# Patient Record
Sex: FEMALE | Race: OTHER | HISPANIC OR LATINO | URBAN - METROPOLITAN AREA
[De-identification: names, ages, dates, MRNs, and addresses within clinical notes are randomized per-mention and may not be internally consistent; named-entity substitution may affect disease eponyms.]

---

## 2024-11-08 ENCOUNTER — EMERGENCY (EMERGENCY)
Facility: HOSPITAL | Age: 84
LOS: 1 days | Discharge: HOME | End: 2024-11-08
Attending: EMERGENCY MEDICINE | Admitting: EMERGENCY MEDICINE
Payer: MEDICARE

## 2024-11-08 VITALS
DIASTOLIC BLOOD PRESSURE: 82 MMHG | WEIGHT: 175.05 LBS | OXYGEN SATURATION: 95 % | RESPIRATION RATE: 18 BRPM | TEMPERATURE: 98 F | HEIGHT: 62 IN | SYSTOLIC BLOOD PRESSURE: 198 MMHG | HEART RATE: 67 BPM

## 2024-11-08 VITALS
RESPIRATION RATE: 18 BRPM | SYSTOLIC BLOOD PRESSURE: 153 MMHG | HEART RATE: 60 BPM | TEMPERATURE: 98 F | DIASTOLIC BLOOD PRESSURE: 81 MMHG | OXYGEN SATURATION: 100 %

## 2024-11-08 LAB
ANION GAP SERPL CALC-SCNC: 8 MMOL/L — SIGNIFICANT CHANGE UP (ref 5–17)
BASOPHILS # BLD AUTO: 0 K/UL — SIGNIFICANT CHANGE UP (ref 0–0.2)
BASOPHILS NFR BLD AUTO: 0 % — SIGNIFICANT CHANGE UP (ref 0–2)
BUN SERPL-MCNC: 17 MG/DL — SIGNIFICANT CHANGE UP (ref 7–23)
CALCIUM SERPL-MCNC: 10.2 MG/DL — SIGNIFICANT CHANGE UP (ref 8.4–10.5)
CHLORIDE SERPL-SCNC: 107 MMOL/L — SIGNIFICANT CHANGE UP (ref 96–108)
CO2 SERPL-SCNC: 24 MMOL/L — SIGNIFICANT CHANGE UP (ref 22–31)
CREAT SERPL-MCNC: 1.03 MG/DL — SIGNIFICANT CHANGE UP (ref 0.5–1.3)
EGFR: 53 ML/MIN/1.73M2 — LOW
EOSINOPHIL # BLD AUTO: 0.28 K/UL — SIGNIFICANT CHANGE UP (ref 0–0.5)
EOSINOPHIL NFR BLD AUTO: 2.9 % — SIGNIFICANT CHANGE UP (ref 0–6)
GLUCOSE SERPL-MCNC: 106 MG/DL — HIGH (ref 70–99)
HCT VFR BLD CALC: 36.2 % — SIGNIFICANT CHANGE UP (ref 34.5–45)
HGB BLD-MCNC: 11.4 G/DL — LOW (ref 11.5–15.5)
IMM GRANULOCYTES NFR BLD AUTO: 0.5 % — SIGNIFICANT CHANGE UP (ref 0–0.9)
LYMPHOCYTES # BLD AUTO: 0.99 K/UL — LOW (ref 1–3.3)
LYMPHOCYTES # BLD AUTO: 10.3 % — LOW (ref 13–44)
MCHC RBC-ENTMCNC: 26.7 PG — LOW (ref 27–34)
MCHC RBC-ENTMCNC: 31.5 G/DL — LOW (ref 32–36)
MCV RBC AUTO: 84.8 FL — SIGNIFICANT CHANGE UP (ref 80–100)
MONOCYTES # BLD AUTO: 0.92 K/UL — HIGH (ref 0–0.9)
MONOCYTES NFR BLD AUTO: 9.5 % — SIGNIFICANT CHANGE UP (ref 2–14)
NEUTROPHILS # BLD AUTO: 7.4 K/UL — SIGNIFICANT CHANGE UP (ref 1.8–7.4)
NEUTROPHILS NFR BLD AUTO: 76.8 % — SIGNIFICANT CHANGE UP (ref 43–77)
NRBC # BLD: 0 /100 WBCS — SIGNIFICANT CHANGE UP (ref 0–0)
PLATELET # BLD AUTO: 168 K/UL — SIGNIFICANT CHANGE UP (ref 150–400)
POTASSIUM SERPL-MCNC: 4.4 MMOL/L — SIGNIFICANT CHANGE UP (ref 3.5–5.3)
POTASSIUM SERPL-SCNC: 4.4 MMOL/L — SIGNIFICANT CHANGE UP (ref 3.5–5.3)
RBC # BLD: 4.27 M/UL — SIGNIFICANT CHANGE UP (ref 3.8–5.2)
RBC # FLD: 15.8 % — HIGH (ref 10.3–14.5)
SODIUM SERPL-SCNC: 139 MMOL/L — SIGNIFICANT CHANGE UP (ref 135–145)
WBC # BLD: 9.64 K/UL — SIGNIFICANT CHANGE UP (ref 3.8–10.5)
WBC # FLD AUTO: 9.64 K/UL — SIGNIFICANT CHANGE UP (ref 3.8–10.5)

## 2024-11-08 PROCEDURE — 80048 BASIC METABOLIC PNL TOTAL CA: CPT

## 2024-11-08 PROCEDURE — 85025 COMPLETE CBC W/AUTO DIFF WBC: CPT

## 2024-11-08 PROCEDURE — 36415 COLL VENOUS BLD VENIPUNCTURE: CPT

## 2024-11-08 PROCEDURE — 96372 THER/PROPH/DIAG INJ SC/IM: CPT

## 2024-11-08 PROCEDURE — 99284 EMERGENCY DEPT VISIT MOD MDM: CPT | Mod: 25

## 2024-11-08 RX ORDER — METHOCARBAMOL 500 MG/1
500 TABLET ORAL ONCE
Refills: 0 | Status: COMPLETED | OUTPATIENT
Start: 2024-11-08 | End: 2024-11-08

## 2024-11-08 RX ORDER — LIDOCAINE HYDROCHLORIDE 40 MG/ML
1 SOLUTION TOPICAL
Qty: 2 | Refills: 0
Start: 2024-11-08

## 2024-11-08 RX ORDER — METHOCARBAMOL 500 MG/1
1 TABLET ORAL
Qty: 20 | Refills: 0
Start: 2024-11-08 | End: 2024-11-12

## 2024-11-08 RX ORDER — LIDOCAINE HYDROCHLORIDE 40 MG/ML
1 SOLUTION TOPICAL ONCE
Refills: 0 | Status: COMPLETED | OUTPATIENT
Start: 2024-11-08 | End: 2024-11-08

## 2024-11-08 RX ORDER — METHOCARBAMOL 500 MG/1
1 TABLET ORAL
Qty: 15 | Refills: 0
Start: 2024-11-08 | End: 2024-11-12

## 2024-11-08 RX ORDER — LIDOCAINE HYDROCHLORIDE 40 MG/ML
1 SOLUTION TOPICAL
Qty: 1 | Refills: 0
Start: 2024-11-08 | End: 2024-11-12

## 2024-11-08 RX ORDER — KETOROLAC TROMETHAMINE 30 MG/ML
15 INJECTION INTRAMUSCULAR; INTRAVENOUS ONCE
Refills: 0 | Status: DISCONTINUED | OUTPATIENT
Start: 2024-11-08 | End: 2024-11-08

## 2024-11-08 RX ORDER — TRAMADOL HYDROCHLORIDE 50 MG/1
1 TABLET, COATED ORAL
Qty: 12 | Refills: 0
Start: 2024-11-08 | End: 2024-11-10

## 2024-11-08 RX ORDER — TRAMADOL HYDROCHLORIDE 50 MG/1
50 TABLET, COATED ORAL ONCE
Refills: 0 | Status: DISCONTINUED | OUTPATIENT
Start: 2024-11-08 | End: 2024-11-08

## 2024-11-08 RX ADMIN — LIDOCAINE HYDROCHLORIDE 1 PATCH: 40 SOLUTION TOPICAL at 05:57

## 2024-11-08 RX ADMIN — TRAMADOL HYDROCHLORIDE 50 MILLIGRAM(S): 50 TABLET, COATED ORAL at 05:58

## 2024-11-08 RX ADMIN — KETOROLAC TROMETHAMINE 15 MILLIGRAM(S): 30 INJECTION INTRAMUSCULAR; INTRAVENOUS at 07:01

## 2024-11-08 RX ADMIN — TRAMADOL HYDROCHLORIDE 50 MILLIGRAM(S): 50 TABLET, COATED ORAL at 07:09

## 2024-11-08 RX ADMIN — METHOCARBAMOL 500 MILLIGRAM(S): 500 TABLET ORAL at 05:59

## 2024-11-08 NOTE — ED PROVIDER NOTE - PROVIDER TOKENS
PROVIDER:[TOKEN:[[id number]]],PROVIDER:[TOKEN:[[id number]]],PROVIDER:[TOKEN:[[id number]]],PROVIDER:[TOKEN:[[id number]]]

## 2024-11-08 NOTE — ED PROVIDER NOTE - PATIENT PORTAL LINK FT
You can access the FollowMyHealth Patient Portal offered by [facility] by registering at the following website: [email or web address]. By joining [employer]’s FollowMyHealth portal, you will also be able to view your health information using other applications (apps) compatible with our system.

## 2024-11-08 NOTE — ED PROVIDER NOTE - CARE PROVIDER_API CALL
Kris Jensen  Orthopaedic Surgery  [address] [facility], [address]  [place] [address]  Phone: [phone]  Fax: [phone]  Follow Up Time:     Prince Lr  Orthopaedic Surgery  [address], [address]  [place] [address]  Phone: [phone]  Fax: [phone]  Follow Up Time:     Rashi Swain  Orthopaedic Surgery  [address], [address]  [place] [address]  Phone: [phone]  Fax: [phone]  Follow Up Time:     Antonio Saravia  Orthopaedic Surgery  [address] [address]  Phone: [phone]  Fax: [phone]  Follow Up Time:

## 2024-11-08 NOTE — ED PROVIDER NOTE - PHYSICAL EXAMINATION
+TTP right trapezius, right paraspinal cspine  pain with moving neck, pain with raising right arm. sensation intact, compartments soft, 2+radial  5/5 , 5/5elbow flex/ext

## 2024-11-08 NOTE — ED PROVIDER NOTE - CLINICAL SUMMARY MEDICAL DECISION MAKING FREE TEXT BOX
right upper back and neck pain, radiates to right arm, tingling to right hand and fingers, c/w radiculopathy. pt had MRI at [facility] showing narrowing and disc degeneration. no relief from ibuprofen and gabapentin. doubt acute cord compression at this time. ROM limited by pain  -lidocaine patch  -robaxin  -tramadol

## 2024-11-08 NOTE — ED PROVIDER NOTE - NSFOLLOWUPINSTRUCTIONS_ED_ALL_ED_FT
Follow-up with spine doctor    Please reach out to Brigid Granados ([facility] clinical referral coordinator) to assist you with your follow-up appointment.     Monday - Friday 11am-7pm  [phone]  [email or web address]    Cervical Radiculopathy    WHAT YOU NEED TO KNOW:    Cervical radiculopathy is a painful condition that happens when a spinal nerve in your neck is pinched or irritated.    DISCHARGE INSTRUCTIONS:    Medicines: You may need any of the following:    NSAIDs help decrease swelling and pain. This medicine can be bought without a doctor's order. This medicine can cause stomach bleeding or kidney problems in certain people. If you take blood thinner medicine, always ask your provider if NSAIDs are safe for you. Always read the medicine label and follow the directions on it before using this medicine.    Prescription pain medicine helps decrease pain. Do not wait until the pain is severe before you take this medicine.    Steroids help decrease pain and swelling. These may be given as a pill or as an injection in your neck. You may need more than 1 injection if your symptoms do not improve after the first treatment.    Take your medicine as directed. Contact your healthcare provider if you think your medicine is not helping or if you have side effects. Tell your provider if you are allergic to any medicine. Keep a list of the medicines, vitamins, and herbs you take. Include the amounts, and when and why you take them. Bring the list or the pill bottles to follow-up visits. Carry your medicine list with you in case of an emergency.  Follow up with your healthcare provider or spine specialist as directed: Write down your questions so you remember to ask them during your visits.    Physical therapy: Your provider may suggest physical therapy to stretch and strengthen your muscles. Your physical therapist can teach you how to improve your posture and the way you hold your neck. The therapist may also teach you how to be safely active and avoid more injury. The therapist can also help you develop an exercise program that is safe for your back and neck.    Self-care:    Ice helps decrease swelling and pain. Ice may also help prevent tissue damage. Use an ice pack, or put crushed ice in a plastic bag. Cover it with a towel and place it on your neck for 15 to 20 minutes every hour or as directed.    Rest when you feel it is needed. Slowly start to do more each day. Return to your daily activities as directed.    Wear a soft collar. You may be given a soft collar to support your neck while you sleep. Wear the soft collar only as directed.  Cervical Collars      Do light stretches and regular exercise. Your provider may suggest light stretches to help decrease stiffness in your neck and arm as you recover. After your pain is controlled, you may benefit from regular exercise. Ask what type of exercise is safe for your back and neck.    Review your work area. A comfortable work area can help prevent neck strain. Ask your employer for an ergonomic review to check the position of your desk, chair, phone, and computer. Make any necessary adjustments for your comfort.  Contact your healthcare provider or spine specialist if:    You have a fever.    You are losing weight without trying.    Your pain is worse, even with medicine.    One or both hands feel more numb than before, or you cannot move your fingers well.    You have questions or concerns about your condition or care.    Radiculopatía cervical    LO QUE NECESITA SABER:    La radiculopatía cervical es steve condición muy dolorosa que sucede cuando se oprime o irrita mecca de los nervios de la columna vertebral.    INSTRUCCIONES SOBRE EL LIANG HOSPITALARIA:    Medicamentos:Es posible que usted necesite alguno de los siguientes:    AINEayudan a disminuir la inflamación y el dolor. Diane medicamento puede comprarse sin receta médica. Diane medicamento puede causar sangrado estomacal o problemas en los riñones en ciertas personas. Si usted parris anticoagulantes, siempre pregúntele a robert médico si los TINA son seguros para usted. Sharon siempre la etiqueta y siga cuidadosamente las instrucciones antes de usar diane medicamento.    Los analgésicos recetadosayudan a calmar el dolor. No espere hasta que el dolor sea severo antes de wilmer diane medicamento.    Los esteroidescontribuyen a aliviar el dolor y bajar la inflamación. Estos podrían administrarse tisha píldora o tisha inyección en el rosa. Es posible que usted necesite más de 1 inyección si los síntomas no mejoran después del primer tratamiento.    [place] jose medicamentos tisha se le haya indicado.Consulte con robert médico si usted svitlana que robert medicamento no le está ayudando o si presenta efectos secundarios. Infórmele al médico si usted es alérgico a algún medicamento. Mantenga steve lista actualizada de los medicamentos, las vitaminas y los productos herbales que parris. Incluya los siguientes datos de los medicamentos: cantidad, frecuencia y motivo de administración. Traiga con usted la lista o los envases de las píldoras a jose citas de seguimiento. Lleve la lista de los medicamentos con usted en jagjit de steve emergencia.  Programe steve kristie de control con robert médico o especialista en columna tisha se lo indicaron:Anote jose preguntas para que se acuerde de hacerlas gurmeet jose visitas.    Fisioterapia:Robert médico podría recomendarle fisioterapia para elongar y fortalecer los músculos. El fisioterapeuta puede enseñarle a mejorar la postura y la forma de sostener el rosa. El terapeuta también podría mostrarle cómo seguir siendo activo de forma henderson y evitar otra lesión. Además el terapeuta le puede ayudar a desarrollar un plan de ejercicios que sea seguro para la espalda y el rosa.    Cuidados personales:    El hieloayuda a disminuir la inflamación y el dolor. El hielo también puede contribuir a evitar el daño de los tejidos. Use steve compresa de hielo o ponga hielo triturado en steve bolsa de plástico. Cúbrala con steve toalla y colóquela en el rosa por 15 a 20 minutos cada hora o tisha se le indique.    Descansecuando sienta que es necesario. Empiece a hacer un poco más día a día. Regrese a jose actividades diarias tisha se le haya indicado.    Use un collarín suave.Es posible que le den un collarín suave para sostener el rosa mientras duerme. Use el collarín solamente tisha se lo indiquen.  Collares cervicales      Realice estiramientos suaves y ejercicio regularmente.El médico podría recomendarle que renuka ejercicios de elongación suaves para ayudar a disminuir la rigidez en el rosa y brazo mientras se recupera. Después de controlar el dolor, usted se podría beneficiar de hacer ejercicio a diario. Pregúntele qué clase de ejercicios son seguros para la espalda y el rosa.    Revise el área de trabajo.Un área de trabajo cómoda puede ayudarle a evitar tensión en el rosa. Pregúntele a robert empleador que realice steve revisión ergonómica para revisar la posición del escritorio, silla, teléfono y computadora. Renuka cualquier ajuste necesario para robert comodidad.  Comuníquese con robert médico o especialista en columna si:    Tiene fiebre.    Usted pierde peso sin proponérselo.    El dolor es peor, aún con el medicamento.    Steve o las dos miles se sienten más adormecidas que antes, o no puede  los dedos del todo.    Usted tiene preguntas o inquietudes acerca de robert condición o cuidado. Follow-up with spine doctor. Take 600 mg motrin/ibuprofen as needed for pain.    Please reach out to Brigid Granados ([facility] ED clinical referral coordinator) to assist you with your follow-up appointment.     Monday - Friday 11am-7pm  [phone]  [email or web address]    Cervical Radiculopathy    WHAT YOU NEED TO KNOW:    Cervical radiculopathy is a painful condition that happens when a spinal nerve in your neck is pinched or irritated.    DISCHARGE INSTRUCTIONS:    Medicines: You may need any of the following:    NSAIDs help decrease swelling and pain. This medicine can be bought without a doctor's order. This medicine can cause stomach bleeding or kidney problems in certain people. If you take blood thinner medicine, always ask your provider if NSAIDs are safe for you. Always read the medicine label and follow the directions on it before using this medicine.    Prescription pain medicine helps decrease pain. Do not wait until the pain is severe before you take this medicine.    Steroids help decrease pain and swelling. These may be given as a pill or as an injection in your neck. You may need more than 1 injection if your symptoms do not improve after the first treatment.    Take your medicine as directed. Contact your healthcare provider if you think your medicine is not helping or if you have side effects. Tell your provider if you are allergic to any medicine. Keep a list of the medicines, vitamins, and herbs you take. Include the amounts, and when and why you take them. Bring the list or the pill bottles to follow-up visits. Carry your medicine list with you in case of an emergency.  Follow up with your healthcare provider or spine specialist as directed: Write down your questions so you remember to ask them during your visits.    Physical therapy: Your provider may suggest physical therapy to stretch and strengthen your muscles. Your physical therapist can teach you how to improve your posture and the way you hold your neck. The therapist may also teach you how to be safely active and avoid more injury. The therapist can also help you develop an exercise program that is safe for your back and neck.    Self-care:    Ice helps decrease swelling and pain. Ice may also help prevent tissue damage. Use an ice pack, or put crushed ice in a plastic bag. Cover it with a towel and place it on your neck for 15 to 20 minutes every hour or as directed.    Rest when you feel it is needed. Slowly start to do more each day. Return to your daily activities as directed.    Wear a soft collar. You may be given a soft collar to support your neck while you sleep. Wear the soft collar only as directed.  Cervical Collars      Do light stretches and regular exercise. Your provider may suggest light stretches to help decrease stiffness in your neck and arm as you recover. After your pain is controlled, you may benefit from regular exercise. Ask what type of exercise is safe for your back and neck.    Review your work area. A comfortable work area can help prevent neck strain. Ask your employer for an ergonomic review to check the position of your desk, chair, phone, and computer. Make any necessary adjustments for your comfort.  Contact your healthcare provider or spine specialist if:    You have a fever.    You are losing weight without trying.    Your pain is worse, even with medicine.    One or both hands feel more numb than before, or you cannot move your fingers well.    You have questions or concerns about your condition or care.    Radiculopatía cervical    LO QUE NECESITA SABER:    La radiculopatía cervical es steve condición muy dolorosa que sucede cuando se oprime o irrita mecca de los nervios de la columna vertebral.    INSTRUCCIONES SOBRE EL LIANG HOSPITALARIA:    Medicamentos:Es posible que usted necesite alguno de los siguientes:    AINEayudan a disminuir la inflamación y el dolor. Diane medicamento puede comprarse sin receta médica. Diane medicamento puede causar sangrado estomacal o problemas en los riñones en ciertas personas. Si usted parris anticoagulantes, siempre pregúntele a robert médico si los TINA son seguros para usted. Sharon siempre la etiqueta y siga cuidadosamente las instrucciones antes de usar diane medicamento.    Los analgésicos recetadosayudan a calmar el dolor. No espere hasta que el dolor sea severo antes de wilmer diane medicamento.    Los esteroidescontribuyen a aliviar el dolor y bajar la inflamación. Estos podrían administrarse tisha píldora o tisha inyección en el rosa. Es posible que usted necesite más de 1 inyección si los síntomas no mejoran después del primer tratamiento.    [place] jose medicamentos tisha se le haya indicado.Consulte con robert médico si usted svitlana que robert medicamento no le está ayudando o si presenta efectos secundarios. Infórmele al médico si usted es alérgico a algún medicamento. Mantenga steve lista actualizada de los medicamentos, las vitaminas y los productos herbales que parris. Incluya los siguientes datos de los medicamentos: cantidad, frecuencia y motivo de administración. Traiga con usted la lista o los envases de las píldoras a jose citas de seguimiento. Lleve la lista de los medicamentos con usted en jagjit de steve emergencia.  Programe steve kristie de control con robert médico o especialista en columna tisha se lo indicaron:Anote jose preguntas para que se acuerde de hacerlas gurmeet jose visitas.    Fisioterapia:Robert médico podría recomendarle fisioterapia para elongar y fortalecer los músculos. El fisioterapeuta puede enseñarle a mejorar la postura y la forma de sostener el rosa. El terapeuta también podría mostrarle cómo seguir siendo activo de forma henderson y evitar otra lesión. Además el terapeuta le puede ayudar a desarrollar un plan de ejercicios que sea seguro para la espalda y el rosa.    Cuidados personales:    El hieloayuda a disminuir la inflamación y el dolor. El hielo también puede contribuir a evitar el daño de los tejidos. Use steve compresa de hielo o ponga hielo triturado en steve bolsa de plástico. Cúbrala con steve toalla y colóquela en el rosa por 15 a 20 minutos cada hora o tisha se le indique.    Descansecuando sienta que es necesario. Empiece a hacer un poco más día a día. Regrese a jose actividades diarias tisha se le haya indicado.    Use un collarín suave.Es posible que le den un collarín suave para sostener el rosa mientras duerme. Use el collarín solamente tisha se lo indiquen.  Collares cervicales      Realice estiramientos suaves y ejercicio regularmente.El médico podría recomendarle que renuka ejercicios de elongación suaves para ayudar a disminuir la rigidez en el rosa y brazo mientras se recupera. Después de controlar el dolor, usted se podría beneficiar de hacer ejercicio a diario. Pregúntele qué clase de ejercicios son seguros para la espalda y el rosa.    Revise el área de trabajo.Un área de trabajo cómoda puede ayudarle a evitar tensión en el rosa. Pregúntele a robert empleador que realice steve revisión ergonómica para revisar la posición del escritorio, silla, teléfono y computadora. Renuka cualquier ajuste necesario para robert comodidad.  Comuníquese con robert médico o especialista en columna si:    Tiene fiebre.    Usted pierde peso sin proponérselo.    El dolor es peor, aún con el medicamento.    Steve o las dos miles se sienten más adormecidas que antes, o no puede  los dedos del todo.    Usted tiene preguntas o inquietudes acerca de robert condición o cuidado.

## 2024-11-08 NOTE — ED PROVIDER NOTE - CARE PROVIDERS DIRECT ADDRESSES
[email or web address]

## 2024-11-08 NOTE — ED PROVIDER NOTE - OBJECTIVE STATEMENT
84F hx htn, c/o right sided upper back pain. pt states pain ongoing for past 2 weeks. states pain radiates down right arm. tingling to right palm and digits 3-5. pt states pain worse with moving arm. pain also to right side of neck. pt seen at [facility] and [place] for pain. at [place] given ibuprofen and gabapentin. states no relief of pain. daughter concerned as [facility] - no arterial abnormalities, nonspecific pulm nodule. MRI: multilevel degenerative disc,  C4-5, C5-6, mod spinal canal severe b/l foraminal narrowing, C6-7 moderate to severe spinal canal and severe b/l foraminal narrowing. pt right hand dominant. Low Dose Naltrexone Pregnancy And Lactation Text: Naltrexone is pregnancy category C.  There have been no adequate and well-controlled studies in pregnant women.  It should be used in pregnancy only if the potential benefit justifies the potential risk to the fetus.   Limited data indicates that naltrexone is minimally excreted into breastmilk.

## 2024-11-11 DIAGNOSIS — I10 ESSENTIAL (PRIMARY) HYPERTENSION: ICD-10-CM

## 2024-11-11 DIAGNOSIS — M54.89 OTHER DORSALGIA: ICD-10-CM

## 2024-11-12 PROBLEM — Z00.00 ENCOUNTER FOR PREVENTIVE HEALTH EXAMINATION: Status: ACTIVE | Noted: 2024-11-12

## 2024-11-13 ENCOUNTER — APPOINTMENT (OUTPATIENT)
Dept: ORTHOPEDIC SURGERY | Facility: CLINIC | Age: 84
End: 2024-11-13
Payer: MEDICARE

## 2024-11-13 VITALS — HEIGHT: 62 IN

## 2024-11-13 DIAGNOSIS — M54.9 DORSALGIA, UNSPECIFIED: ICD-10-CM

## 2024-11-13 DIAGNOSIS — Z86.79 PERSONAL HISTORY OF OTHER DISEASES OF THE CIRCULATORY SYSTEM: ICD-10-CM

## 2024-11-13 DIAGNOSIS — M54.12 RADICULOPATHY, CERVICAL REGION: ICD-10-CM

## 2024-11-13 DIAGNOSIS — Z78.9 OTHER SPECIFIED HEALTH STATUS: ICD-10-CM

## 2024-11-13 DIAGNOSIS — M54.2 CERVICALGIA: ICD-10-CM

## 2024-11-13 PROBLEM — I10 ESSENTIAL (PRIMARY) HYPERTENSION: Chronic | Status: ACTIVE | Noted: 2024-11-08

## 2024-11-13 PROCEDURE — 99204 OFFICE O/P NEW MOD 45 MIN: CPT | Mod: 25

## 2024-11-13 PROCEDURE — 72070 X-RAY EXAM THORAC SPINE 2VWS: CPT

## 2024-11-13 PROCEDURE — 72050 X-RAY EXAM NECK SPINE 4/5VWS: CPT

## 2024-11-13 RX ORDER — GABAPENTIN 300 MG/1
300 CAPSULE ORAL 3 TIMES DAILY
Qty: 90 | Refills: 0 | Status: ACTIVE | COMMUNITY
Start: 2024-11-13 | End: 1900-01-01

## 2024-11-14 PROBLEM — M54.9 UPPER BACK PAIN: Status: ACTIVE | Noted: 2024-11-14

## 2024-11-14 PROBLEM — M54.2 NECK PAIN: Status: ACTIVE | Noted: 2024-11-14

## 2024-12-10 ENCOUNTER — APPOINTMENT (OUTPATIENT)
Dept: ORTHOPEDIC SURGERY | Facility: CLINIC | Age: 84
End: 2024-12-10

## 2024-12-19 ENCOUNTER — APPOINTMENT (OUTPATIENT)
Dept: ORTHOPEDIC SURGERY | Facility: CLINIC | Age: 84
End: 2024-12-19
Payer: MEDICARE

## 2024-12-19 DIAGNOSIS — M54.9 DORSALGIA, UNSPECIFIED: ICD-10-CM

## 2024-12-19 DIAGNOSIS — M54.2 CERVICALGIA: ICD-10-CM

## 2024-12-19 DIAGNOSIS — M54.12 RADICULOPATHY, CERVICAL REGION: ICD-10-CM

## 2024-12-19 PROCEDURE — 99214 OFFICE O/P EST MOD 30 MIN: CPT

## 2025-02-13 ENCOUNTER — APPOINTMENT (OUTPATIENT)
Dept: ORTHOPEDIC SURGERY | Facility: CLINIC | Age: 85
End: 2025-02-13
Payer: MEDICARE

## 2025-02-13 DIAGNOSIS — M54.9 DORSALGIA, UNSPECIFIED: ICD-10-CM

## 2025-02-13 DIAGNOSIS — M54.12 RADICULOPATHY, CERVICAL REGION: ICD-10-CM

## 2025-02-13 DIAGNOSIS — M54.2 CERVICALGIA: ICD-10-CM

## 2025-02-13 PROCEDURE — 99214 OFFICE O/P EST MOD 30 MIN: CPT

## 2025-04-02 ENCOUNTER — APPOINTMENT (OUTPATIENT)
Dept: ORTHOPEDIC SURGERY | Facility: CLINIC | Age: 85
End: 2025-04-02

## 2025-04-02 DIAGNOSIS — M54.9 DORSALGIA, UNSPECIFIED: ICD-10-CM

## 2025-04-02 DIAGNOSIS — M25.512 PAIN IN LEFT SHOULDER: ICD-10-CM

## 2025-04-02 DIAGNOSIS — M54.2 CERVICALGIA: ICD-10-CM

## 2025-04-02 DIAGNOSIS — M54.12 RADICULOPATHY, CERVICAL REGION: ICD-10-CM

## 2025-04-02 PROCEDURE — 20610 DRAIN/INJ JOINT/BURSA W/O US: CPT | Mod: LT

## 2025-04-02 PROCEDURE — 99214 OFFICE O/P EST MOD 30 MIN: CPT | Mod: 25

## 2025-04-02 PROCEDURE — 73030 X-RAY EXAM OF SHOULDER: CPT | Mod: LT

## 2025-04-30 ENCOUNTER — APPOINTMENT (OUTPATIENT)
Dept: ORTHOPEDIC SURGERY | Facility: CLINIC | Age: 85
End: 2025-04-30
Payer: MEDICARE

## 2025-04-30 DIAGNOSIS — M54.2 CERVICALGIA: ICD-10-CM

## 2025-04-30 DIAGNOSIS — M25.512 PAIN IN LEFT SHOULDER: ICD-10-CM

## 2025-04-30 DIAGNOSIS — M54.9 DORSALGIA, UNSPECIFIED: ICD-10-CM

## 2025-04-30 DIAGNOSIS — M54.12 RADICULOPATHY, CERVICAL REGION: ICD-10-CM

## 2025-04-30 PROCEDURE — 99214 OFFICE O/P EST MOD 30 MIN: CPT
